# Patient Record
Sex: FEMALE | Race: WHITE | Employment: OTHER | URBAN - METROPOLITAN AREA
[De-identification: names, ages, dates, MRNs, and addresses within clinical notes are randomized per-mention and may not be internally consistent; named-entity substitution may affect disease eponyms.]

---

## 2017-04-08 ENCOUNTER — HOSPITAL ENCOUNTER (EMERGENCY)
Facility: HOSPITAL | Age: 62
Discharge: HOME | End: 2017-04-08
Attending: EMERGENCY MEDICINE
Payer: OTHER MISCELLANEOUS

## 2017-04-08 VITALS
DIASTOLIC BLOOD PRESSURE: 68 MMHG | SYSTOLIC BLOOD PRESSURE: 109 MMHG | BODY MASS INDEX: 37.21 KG/M2 | HEIGHT: 63 IN | HEART RATE: 65 BPM | OXYGEN SATURATION: 97 % | WEIGHT: 210 LBS | RESPIRATION RATE: 18 BRPM | TEMPERATURE: 98 F

## 2017-04-08 DIAGNOSIS — S30.0XXA PELVIC CONTUSION, INITIAL ENCOUNTER: Primary | ICD-10-CM

## 2017-04-08 PROCEDURE — 99282 EMERGENCY DEPT VISIT SF MDM: CPT

## 2017-04-09 NOTE — ED PROVIDER NOTES
Patient Seen in: [facility] Emergency Department    History   Patient presents with:  Lower Extremity Injury (musculoskeletal)    Stated Complaint: workmens comp, hip pain, wheelchair ran into her    HPI    51-year-old female sent in by work after s reviewed and negative except as noted above. PSFH elements reviewed from today and agreed except as otherwise stated in HPI.     Physical Exam       ED Triage Vitals   BP 04/08/17 1803 136/90 mmHg   Pulse 04/08/17 1803 75   Resp 04/08/17 1803 18   Temp 0 soon as possible for a visit        Medications Prescribed:  Current Discharge Medication List

## 2019-01-02 ENCOUNTER — OFFICE VISIT (OUTPATIENT)
Dept: FAMILY MEDICINE CLINIC | Facility: CLINIC | Age: 64
End: 2019-01-02
Payer: COMMERCIAL

## 2019-01-02 VITALS
HEIGHT: 62 IN | RESPIRATION RATE: 20 BRPM | TEMPERATURE: 98 F | WEIGHT: 228.63 LBS | DIASTOLIC BLOOD PRESSURE: 90 MMHG | SYSTOLIC BLOOD PRESSURE: 150 MMHG | HEART RATE: 66 BPM | BODY MASS INDEX: 42.07 KG/M2

## 2019-01-02 DIAGNOSIS — Z76.89 ENCOUNTER TO ESTABLISH CARE: ICD-10-CM

## 2019-01-02 DIAGNOSIS — R09.81 NASAL CONGESTION: Primary | ICD-10-CM

## 2019-01-02 PROCEDURE — 99203 OFFICE O/P NEW LOW 30 MIN: CPT | Performed by: FAMILY MEDICINE

## 2019-01-02 RX ORDER — LEVOTHYROXINE SODIUM 50 UG/1
CAPSULE ORAL
COMMUNITY
End: 2019-06-21

## 2019-01-02 RX ORDER — TRAZODONE HYDROCHLORIDE 50 MG/1
50 TABLET ORAL
COMMUNITY

## 2019-01-02 RX ORDER — MONTELUKAST SODIUM 10 MG/1
10 TABLET ORAL NIGHTLY
Qty: 30 TABLET | Refills: 1 | Status: SHIPPED | OUTPATIENT
Start: 2019-01-02 | End: 2019-03-27

## 2019-01-02 RX ORDER — OMEPRAZOLE 20 MG/1
20 CAPSULE, DELAYED RELEASE ORAL
COMMUNITY

## 2019-01-02 RX ORDER — ERGOCALCIFEROL 1.25 MG/1
CAPSULE ORAL
COMMUNITY

## 2019-01-02 RX ORDER — ALBUTEROL SULFATE 90 UG/1
AEROSOL, METERED RESPIRATORY (INHALATION)
COMMUNITY
End: 2019-05-23

## 2019-01-02 RX ORDER — FLUTICASONE PROPIONATE 50 MCG
2 SPRAY, SUSPENSION (ML) NASAL DAILY
Qty: 1 BOTTLE | Refills: 0 | Status: SHIPPED | OUTPATIENT
Start: 2019-01-02 | End: 2019-01-16

## 2019-01-02 NOTE — PROGRESS NOTES
HPI:    Patient ID: Cristina Hanson is a 66year old female. Patient presents with:  Establish Care  Medication Request: montelukast       HPI  Patient is here to establish care. States she recently moved from [place].  Has a h/o partial Thyroidectomy Rfl: 5   Dorzolamide HCl-Timolol Mal 22.3-6.8 MG/ML Ophthalmic Solution Place 1 drop into both eyes 2 (two) times daily. Disp: 10 mL Rfl: 2   Sertraline HCl 100 MG Oral Tab Take 100 mg by mouth daily.  Disp:  Rfl:    Lithium Carbonate 300 MG Oral Cap Take 3 wheezes. She has no rales. Lymphadenopathy:     She has no cervical adenopathy.               ASSESSMENT/PLAN:     Anjel Mahan was seen today for establish care and medication request.    Diagnoses and all orders for this visit:    Nasal congestion  Most like

## 2019-01-10 ENCOUNTER — OFFICE VISIT (OUTPATIENT)
Dept: FAMILY MEDICINE CLINIC | Facility: CLINIC | Age: 64
End: 2019-01-10
Payer: COMMERCIAL

## 2019-01-10 ENCOUNTER — HOSPITAL ENCOUNTER (OUTPATIENT)
Dept: MAMMOGRAPHY | Age: 64
Discharge: HOME | End: 2019-01-10
Attending: FAMILY MEDICINE
Payer: COMMERCIAL

## 2019-01-10 DIAGNOSIS — R09.81 NASAL CONGESTION: ICD-10-CM

## 2019-01-10 DIAGNOSIS — Z00.00 PHYSICAL EXAM: Primary | ICD-10-CM

## 2019-01-10 DIAGNOSIS — Z12.11 ENCOUNTER FOR SCREENING COLONOSCOPY: ICD-10-CM

## 2019-01-10 DIAGNOSIS — Z11.51 SCREENING FOR HPV (HUMAN PAPILLOMAVIRUS): ICD-10-CM

## 2019-01-10 DIAGNOSIS — Z12.31 SCREENING MAMMOGRAM, ENCOUNTER FOR: ICD-10-CM

## 2019-01-10 DIAGNOSIS — R05.9 COUGH: ICD-10-CM

## 2019-01-10 DIAGNOSIS — Z13.220 LIPID SCREENING: ICD-10-CM

## 2019-01-10 LAB
ANION GAP SERPL CALC-SCNC: 8 MMOL/L (ref 0–18)
BUN BLD-MCNC: 17 MG/DL (ref 8–20)
BUN/CREAT SERPL: 20.7 (ref 10–20)
CALCIUM BLD-MCNC: 9.4 MG/DL (ref 8.3–10.3)
CHLORIDE SERPL-SCNC: 106 MMOL/L (ref 101–111)
CHOLEST SMN-MCNC: 304 MG/DL (ref ?–200)
CO2 SERPL-SCNC: 26 MMOL/L (ref 22–32)
CREAT BLD-MCNC: 0.82 MG/DL (ref 0.55–1.02)
GLUCOSE BLD-MCNC: 98 MG/DL (ref 70–99)
HDLC SERPL-MCNC: 50 MG/DL (ref 40–59)
LDLC SERPL CALC-MCNC: 199 MG/DL (ref ?–100)
NONHDLC SERPL-MCNC: 254 MG/DL (ref ?–130)
OSMOLALITY SERPL CALC.SUM OF ELEC: 292 MOSM/KG (ref 275–295)
POTASSIUM SERPL-SCNC: 4 MMOL/L (ref 3.6–5.1)
SODIUM SERPL-SCNC: 140 MMOL/L (ref 136–144)
TRIGL SERPL-MCNC: 276 MG/DL (ref 30–149)
TSI SER-ACNC: 2.02 MIU/ML (ref 0.35–5.5)
VLDLC SERPL CALC-MCNC: 55 MG/DL (ref 0–30)

## 2019-01-10 PROCEDURE — 80061 LIPID PANEL: CPT | Performed by: FAMILY MEDICINE

## 2019-01-10 PROCEDURE — 99396 PREV VISIT EST AGE 40-64: CPT | Performed by: FAMILY MEDICINE

## 2019-01-10 PROCEDURE — 90732 PPSV23 VACC 2 YRS+ SUBQ/IM: CPT | Performed by: FAMILY MEDICINE

## 2019-01-10 PROCEDURE — 90471 IMMUNIZATION ADMIN: CPT | Performed by: FAMILY MEDICINE

## 2019-01-10 PROCEDURE — 80048 BASIC METABOLIC PNL TOTAL CA: CPT | Performed by: FAMILY MEDICINE

## 2019-01-10 PROCEDURE — 36415 COLL VENOUS BLD VENIPUNCTURE: CPT | Performed by: FAMILY MEDICINE

## 2019-01-10 PROCEDURE — 77067 SCR MAMMO BI INCL CAD: CPT | Performed by: FAMILY MEDICINE

## 2019-01-10 PROCEDURE — 88175 CYTOPATH C/V AUTO FLUID REDO: CPT | Performed by: FAMILY MEDICINE

## 2019-01-10 PROCEDURE — 84443 ASSAY THYROID STIM HORMONE: CPT | Performed by: FAMILY MEDICINE

## 2019-01-10 RX ORDER — AZITHROMYCIN 250 MG/1
TABLET, FILM COATED ORAL
Qty: 6 TABLET | Refills: 0 | Status: SHIPPED | OUTPATIENT
Start: 2019-01-13 | End: 2019-01-14

## 2019-01-10 NOTE — PROGRESS NOTES
HPI:    Patient ID: Elliott Long is a 66year old female.     Patient presents with:  Physical: per pt, physical with pap  Mammogram Screening: reports last mammogram approx 2 years ago  Colon Cancer Screening: reports last colonoscopy approx 5 years ago mg by mouth. Disp:  Rfl:    Montelukast Sodium 10 MG Oral Tab Take 1 tablet (10 mg total) by mouth nightly. Disp: 30 tablet Rfl: 1   Fluticasone Propionate 50 MCG/ACT Nasal Suspension 2 sprays by Each Nare route daily for 14 days.  Disp: 1 Bottle Rfl: 0   l oropharyngeal edema or posterior oropharyngeal erythema. Neck: Normal range of motion. Cardiovascular: Normal heart sounds. No murmur heard. Pulmonary/Chest: Effort normal and breath sounds normal. She has no wheezes. She has no rales.  Right breast

## 2019-01-11 ENCOUNTER — TELEPHONE (OUTPATIENT)
Dept: FAMILY MEDICINE CLINIC | Facility: CLINIC | Age: 64
End: 2019-01-11

## 2019-01-11 VITALS
HEART RATE: 70 BPM | BODY MASS INDEX: 42 KG/M2 | SYSTOLIC BLOOD PRESSURE: 127 MMHG | WEIGHT: 228 LBS | DIASTOLIC BLOOD PRESSURE: 88 MMHG

## 2019-01-11 DIAGNOSIS — E78.00 ELEVATED CHOLESTEROL: Primary | ICD-10-CM

## 2019-01-14 ENCOUNTER — OFFICE VISIT (OUTPATIENT)
Dept: FAMILY MEDICINE CLINIC | Facility: CLINIC | Age: 64
End: 2019-01-14
Payer: COMMERCIAL

## 2019-01-14 VITALS
WEIGHT: 231.38 LBS | HEART RATE: 86 BPM | SYSTOLIC BLOOD PRESSURE: 124 MMHG | BODY MASS INDEX: 41.51 KG/M2 | HEIGHT: 62.5 IN | OXYGEN SATURATION: 95 % | DIASTOLIC BLOOD PRESSURE: 92 MMHG | TEMPERATURE: 98 F

## 2019-01-14 DIAGNOSIS — R10.2 PELVIC PAIN: Primary | ICD-10-CM

## 2019-01-14 PROCEDURE — 99214 OFFICE O/P EST MOD 30 MIN: CPT | Performed by: FAMILY MEDICINE

## 2019-01-14 RX ORDER — TIZANIDINE HYDROCHLORIDE 4 MG/1
4 CAPSULE, GELATIN COATED ORAL 2 TIMES DAILY
Qty: 20 CAPSULE | Refills: 0 | Status: SHIPPED | OUTPATIENT
Start: 2019-01-14 | End: 2019-01-24

## 2019-01-14 RX ORDER — SULFAMETHOXAZOLE AND TRIMETHOPRIM 800; 160 MG/1; MG/1
1 TABLET ORAL 2 TIMES DAILY
Qty: 10 TABLET | Refills: 0 | Status: SHIPPED | OUTPATIENT
Start: 2019-01-14 | End: 2019-07-10

## 2019-01-14 NOTE — PROGRESS NOTES
Ciara Duncan is a 66year old female. Patient presents with:  Vaginal Problem: discuss a few things. .room 5    Subjective   HPI:   Patient here to establish care. Patient states that last week she went to her prior provider and had a Pap smear done. (VENTOLIN HFA) 108 (90 Base) MCG/ACT Inhalation Aero Soln Inhale into the lungs. Disp:  Rfl:    Levothyroxine Sodium (TIROSINT) 50 MCG Oral Cap Take by mouth. Disp:  Rfl:    omeprazole (PRILOSEC) 20 MG Oral Capsule Delayed Release Take 20 mg by mouth.  Disp Gap 8 0 - 18 mmol/L    BUN 17 8 - 20 mg/dL    Creatinine 0.82 0.55 - 1.02 mg/dL    BUN/CREA Ratio 20.7 (H) 10.0 - 20.0    Calcium, Total 9.4 8.3 - 10.3 mg/dL    Calculated Osmolality 292 275 - 295 mOsm/kg    GFR, Non- 77 >=60    GFR, [place] Case: [id number]                                  Authorizing Provider:  Shannen Liriano MD         Collected:           01/10/2019 09:23 AM          Ordering Location:     [place] [address],      Received:            01/10/2019 09:42 PM Prescriptions Disp Refills   • TiZANidine HCl 4 MG Oral Cap 20 capsule 0     Sig: Take 1 capsule (4 mg total) by mouth 2 (two) times daily for 10 days.    • Sulfamethoxazole-TMP -160 MG Oral Tab per tablet 10 tablet 0     Sig: Take 1 tablet by mouth 2

## 2019-01-24 ENCOUNTER — HOSPITAL ENCOUNTER (OUTPATIENT)
Dept: ULTRASOUND IMAGING | Age: 64
Discharge: HOME | End: 2019-01-24
Attending: FAMILY MEDICINE
Payer: COMMERCIAL

## 2019-01-24 ENCOUNTER — HOSPITAL ENCOUNTER (OUTPATIENT)
Dept: MAMMOGRAPHY | Age: 64
Discharge: HOME | End: 2019-01-24
Attending: FAMILY MEDICINE
Payer: COMMERCIAL

## 2019-01-24 DIAGNOSIS — R92.2 INCONCLUSIVE MAMMOGRAM: ICD-10-CM

## 2019-01-24 PROCEDURE — 76642 ULTRASOUND BREAST LIMITED: CPT | Performed by: FAMILY MEDICINE

## 2019-01-24 PROCEDURE — 77065 DX MAMMO INCL CAD UNI: CPT | Performed by: FAMILY MEDICINE

## 2019-01-24 PROCEDURE — 77061 BREAST TOMOSYNTHESIS UNI: CPT | Performed by: FAMILY MEDICINE

## 2019-02-18 ENCOUNTER — TELEPHONE (OUTPATIENT)
Dept: FAMILY MEDICINE CLINIC | Facility: CLINIC | Age: 64
End: 2019-02-18

## 2019-02-18 NOTE — TELEPHONE ENCOUNTER
Last OV: 1/14/20109  Last filled: historical    Left message for pt.  Need to know how many puffs at a time and how many times a day

## 2019-03-27 ENCOUNTER — TELEPHONE (OUTPATIENT)
Dept: FAMILY MEDICINE CLINIC | Facility: CLINIC | Age: 64
End: 2019-03-27

## 2019-03-27 RX ORDER — MONTELUKAST SODIUM 10 MG/1
10 TABLET ORAL NIGHTLY
Qty: 30 TABLET | Refills: 1 | Status: SHIPPED | OUTPATIENT
Start: 2019-03-27 | End: 2019-05-27

## 2019-04-15 ENCOUNTER — OFFICE VISIT (OUTPATIENT)
Dept: FAMILY MEDICINE CLINIC | Facility: CLINIC | Age: 64
End: 2019-04-15
Payer: COMMERCIAL

## 2019-04-15 VITALS
TEMPERATURE: 99 F | DIASTOLIC BLOOD PRESSURE: 86 MMHG | SYSTOLIC BLOOD PRESSURE: 128 MMHG | BODY MASS INDEX: 39.95 KG/M2 | WEIGHT: 234 LBS | HEART RATE: 80 BPM | RESPIRATION RATE: 12 BRPM | HEIGHT: 64 IN

## 2019-04-15 DIAGNOSIS — R10.31 RLQ ABDOMINAL PAIN: Primary | ICD-10-CM

## 2019-04-15 PROBLEM — F43.12 CHRONIC POST-TRAUMATIC STRESS DISORDER (PTSD): Status: ACTIVE | Noted: 2019-03-14

## 2019-04-15 PROBLEM — F31.81 BIPOLAR 2 DISORDER (HCC): Status: ACTIVE | Noted: 2019-03-14

## 2019-04-15 PROCEDURE — 99213 OFFICE O/P EST LOW 20 MIN: CPT | Performed by: FAMILY MEDICINE

## 2019-04-15 NOTE — PROGRESS NOTES
Sergey Reyez is a 66year old female.   Patient presents with:  Abdominal Pain: .inrm 6   Flu    Subjective   HPI:   3  Day history   Abdomen pain  Aches  Headache  Chills  Mild fever    Emesis x 1  Mildly nauseous       Patient believes she has a stomac Oral Cap Take 300 mg by mouth 2 (two) times daily with meals. Disp:  Rfl:    AmLODIPine Besylate 10 MG Oral Tab Take 10 mg by mouth daily. Disp:  Rfl:      No current facility-administered medications on file prior to visit.       Past Medical History:   Isaac Lo covers-called to ER by me          Phone call to [phone] with Dr Andrew Quesada Emergency Room dr David Ellis history  Patient on way in private car        Return if symptoms worsen or fail to improve, for after consultant evaluation.           Meds & Refill

## 2019-04-18 ENCOUNTER — OFFICE VISIT (OUTPATIENT)
Dept: FAMILY MEDICINE CLINIC | Facility: CLINIC | Age: 64
End: 2019-04-18
Payer: COMMERCIAL

## 2019-04-18 VITALS
DIASTOLIC BLOOD PRESSURE: 88 MMHG | RESPIRATION RATE: 16 BRPM | HEART RATE: 67 BPM | TEMPERATURE: 97 F | SYSTOLIC BLOOD PRESSURE: 126 MMHG | WEIGHT: 229.63 LBS | OXYGEN SATURATION: 95 % | BODY MASS INDEX: 39 KG/M2

## 2019-04-18 DIAGNOSIS — J18.9 PNEUMONIA OF RIGHT LOWER LOBE DUE TO INFECTIOUS ORGANISM: Primary | ICD-10-CM

## 2019-04-18 PROCEDURE — 99213 OFFICE O/P EST LOW 20 MIN: CPT | Performed by: FAMILY MEDICINE

## 2019-04-18 RX ORDER — LEVOFLOXACIN 750 MG/1
TABLET ORAL
Refills: 0 | COMMUNITY
Start: 2019-04-16 | End: 2019-05-02 | Stop reason: ALTCHOICE

## 2019-04-19 NOTE — PROGRESS NOTES
Fitz Elliott is a 66year old female. Patient presents with: Follow - Up: per pt    Subjective   HPI:   Patient here for follow-up after having been seen in the emergency room.     We saw her earlier this week and patient had night sweats chills fevers MCG/ACT Inhalation Aero Soln Inhale into the lungs. Disp:  Rfl:    Levothyroxine Sodium (TIROSINT) 50 MCG Oral Cap Take by mouth. Disp:  Rfl:    omeprazole (PRILOSEC) 20 MG Oral Capsule Delayed Release Take 20 mg by mouth.  Disp:  Rfl:    TraZODone HCl 50 M kg/m².      GENERAL: well developed, well nourished,in no apparent distress  SKIN: no rashes,no suspicious lesions  LUNGS: clear to auscultation  CARDIO: RRR without murmur  GI: good BS's,no masses, HSM or tenderness  EXTREMITIES: no cyanosis, clubbing or

## 2019-05-02 ENCOUNTER — OFFICE VISIT (OUTPATIENT)
Dept: FAMILY MEDICINE CLINIC | Facility: CLINIC | Age: 64
End: 2019-05-02
Payer: COMMERCIAL

## 2019-05-02 ENCOUNTER — HOSPITAL ENCOUNTER (OUTPATIENT)
Dept: GENERAL RADIOLOGY | Age: 64
Discharge: HOME | End: 2019-05-02
Attending: FAMILY MEDICINE

## 2019-05-02 VITALS
DIASTOLIC BLOOD PRESSURE: 80 MMHG | TEMPERATURE: 97 F | WEIGHT: 234 LBS | OXYGEN SATURATION: 98 % | RESPIRATION RATE: 12 BRPM | BODY MASS INDEX: 39.95 KG/M2 | SYSTOLIC BLOOD PRESSURE: 110 MMHG | HEIGHT: 64 IN | HEART RATE: 61 BPM

## 2019-05-02 DIAGNOSIS — J18.9 PNEUMONIA OF RIGHT LOWER LOBE DUE TO INFECTIOUS ORGANISM: Primary | ICD-10-CM

## 2019-05-02 DIAGNOSIS — M41.34 THORACOGENIC SCOLIOSIS OF THORACIC REGION: ICD-10-CM

## 2019-05-02 DIAGNOSIS — J18.9 PNEUMONIA OF RIGHT LOWER LOBE DUE TO INFECTIOUS ORGANISM: ICD-10-CM

## 2019-05-02 PROCEDURE — 99214 OFFICE O/P EST MOD 30 MIN: CPT | Performed by: FAMILY MEDICINE

## 2019-05-02 PROCEDURE — 71046 X-RAY EXAM CHEST 2 VIEWS: CPT | Performed by: FAMILY MEDICINE

## 2019-05-02 NOTE — PROGRESS NOTES
Anum Flanagan is a 66year old female. Patient presents with:  Pneumonia: recheck . Jessi Aguila ..inrm6    Chief Complaint Reviewed and Verified  Nursing Notes Reviewed and Verified  Tobacco Reviewed  Allergies Reviewed  Medications Reviewed  Problem List Reviewed Disp:  Rfl:    Levothyroxine Sodium (TIROSINT) 50 MCG Oral Cap Take by mouth. Disp:  Rfl:    omeprazole (PRILOSEC) 20 MG Oral Capsule Delayed Release Take 20 mg by mouth. Disp:  Rfl:    TraZODone HCl 50 MG Oral Tab Take 50 mg by mouth.  Disp:  Rfl:    bruce BMI 40.17 kg/m²  Body mass index is 40.17 kg/m².       GENERAL: well developed, well nourished,in no apparent distress  SKIN: no rashes,no suspicious lesions  No pallor jaundice  No icterus   HEENT: atraumatic, normocephalic,ears and throat are clear  LUNGS

## 2019-05-02 NOTE — ASSESSMENT & PLAN NOTE
Noted today on chest x-ray, patient states she may be needing refill at some point for TENS unit apparatus

## 2019-05-07 ENCOUNTER — TELEPHONE (OUTPATIENT)
Dept: FAMILY MEDICINE CLINIC | Facility: CLINIC | Age: 64
End: 2019-05-07

## 2019-05-07 RX ORDER — AMLODIPINE BESYLATE 10 MG/1
10 TABLET ORAL DAILY
Qty: 90 TABLET | Refills: 0 | Status: SHIPPED | OUTPATIENT
Start: 2019-05-07 | End: 2019-08-06

## 2019-05-07 NOTE — TELEPHONE ENCOUNTER
Amlodipine  10 mg    qvar inhaler  80 mcg      Call to [facility] in [place]

## 2019-05-23 RX ORDER — ALBUTEROL SULFATE 90 UG/1
2 AEROSOL, METERED RESPIRATORY (INHALATION) EVERY 4 HOURS PRN
Qty: 1 INHALER | Refills: 0 | Status: SHIPPED | OUTPATIENT
Start: 2019-05-23 | End: 2019-08-08

## 2019-05-28 RX ORDER — MONTELUKAST SODIUM 10 MG/1
TABLET ORAL
Qty: 30 TABLET | Refills: 1 | Status: SHIPPED | OUTPATIENT
Start: 2019-05-28 | End: 2019-07-23

## 2019-06-21 RX ORDER — LEVOTHYROXINE SODIUM 50 UG/1
1 CAPSULE ORAL DAILY
Qty: 30 CAPSULE | Refills: 6 | Status: SHIPPED | OUTPATIENT
Start: 2019-06-21 | End: 2020-06-22

## 2019-06-24 ENCOUNTER — OFFICE VISIT (OUTPATIENT)
Dept: FAMILY MEDICINE CLINIC | Facility: CLINIC | Age: 64
End: 2019-06-24
Payer: COMMERCIAL

## 2019-06-24 VITALS
TEMPERATURE: 97 F | DIASTOLIC BLOOD PRESSURE: 80 MMHG | RESPIRATION RATE: 12 BRPM | SYSTOLIC BLOOD PRESSURE: 120 MMHG | HEIGHT: 63 IN | WEIGHT: 239.5 LBS | BODY MASS INDEX: 42.44 KG/M2 | HEART RATE: 64 BPM

## 2019-06-24 DIAGNOSIS — M70.51 PES ANSERINUS BURSITIS OF RIGHT KNEE: Primary | ICD-10-CM

## 2019-06-24 PROCEDURE — 99213 OFFICE O/P EST LOW 20 MIN: CPT | Performed by: FAMILY MEDICINE

## 2019-06-24 PROCEDURE — S0020 INJECTION, BUPIVICAINE HYDRO: HCPCS | Performed by: FAMILY MEDICINE

## 2019-06-24 PROCEDURE — 20550 NJX 1 TENDON SHEATH/LIGAMENT: CPT | Performed by: FAMILY MEDICINE

## 2019-06-24 RX ORDER — BUPIVACAINE HYDROCHLORIDE 5 MG/ML
0.5 INJECTION, SOLUTION EPIDURAL; INTRACAUDAL ONCE
Status: COMPLETED | OUTPATIENT
Start: 2019-06-24 | End: 2019-06-24

## 2019-06-24 RX ORDER — TRIAMCINOLONE ACETONIDE 40 MG/ML
20 INJECTION, SUSPENSION INTRA-ARTICULAR; INTRAMUSCULAR ONCE
Status: COMPLETED | OUTPATIENT
Start: 2019-06-24 | End: 2019-06-24

## 2019-06-24 RX ADMIN — TRIAMCINOLONE ACETONIDE 20 MG: 40 INJECTION, SUSPENSION INTRA-ARTICULAR; INTRAMUSCULAR at 10:34:00

## 2019-06-24 RX ADMIN — BUPIVACAINE HYDROCHLORIDE 0.5 ML: 5 INJECTION, SOLUTION EPIDURAL; INTRACAUDAL at 10:33:00

## 2019-06-24 NOTE — PATIENT INSTRUCTIONS
What Is Bursitis? A bursa is a fluid-filled sac. It helps cushion the muscles, tendons, and bones around a joint. When a bursa becomes inflamed, it’s called bursitis. Common symptoms are pain, tenderness, and swelling that limits movement of the joint.

## 2019-06-25 ENCOUNTER — TELEPHONE (OUTPATIENT)
Dept: FAMILY MEDICINE CLINIC | Facility: CLINIC | Age: 64
End: 2019-06-25

## 2019-06-25 DIAGNOSIS — M70.51 PES ANSERINUS BURSITIS OF RIGHT KNEE: Primary | ICD-10-CM

## 2019-06-25 RX ORDER — MELOXICAM 15 MG/1
15 TABLET ORAL DAILY
Qty: 30 TABLET | Refills: 0 | Status: SHIPPED | OUTPATIENT
Start: 2019-06-25 | End: 2020-06-22 | Stop reason: ALTCHOICE

## 2019-06-25 NOTE — TELEPHONE ENCOUNTER
Sent meloxicam    We can fill the thyroid    Does not need brand only generic    Not certain she needs increase in medications for thyroid    But refill ok

## 2019-06-25 NOTE — TELEPHONE ENCOUNTER
Please advise in regards to medication for pain in regards to bursitis? ?        Levothyroxine was sent to [facility] 6/24/2019.  Spoke with Sofia Harden at [facility] who states that script is currently being filled for pt

## 2019-06-25 NOTE — TELEPHONE ENCOUNTER
WAS SEEN YESTERDAY, THOUGHT  WAS CHANGING HER THYROID MEDS & SENDING TO [facility], BUT THEY NEVER GOT ANYTHING, ALSO HE MENTIONED SOMETHING ABOUT A PAIN MEDICATION?  CALL PT

## 2019-07-01 ENCOUNTER — MED REC SCAN ONLY (OUTPATIENT)
Dept: FAMILY MEDICINE CLINIC | Facility: CLINIC | Age: 64
End: 2019-07-01

## 2019-07-10 ENCOUNTER — OFFICE VISIT (OUTPATIENT)
Dept: FAMILY MEDICINE CLINIC | Facility: CLINIC | Age: 64
End: 2019-07-10
Payer: COMMERCIAL

## 2019-07-10 VITALS
TEMPERATURE: 98 F | BODY MASS INDEX: 41.84 KG/M2 | WEIGHT: 236.13 LBS | RESPIRATION RATE: 12 BRPM | HEART RATE: 68 BPM | SYSTOLIC BLOOD PRESSURE: 128 MMHG | DIASTOLIC BLOOD PRESSURE: 80 MMHG | HEIGHT: 63 IN

## 2019-07-10 DIAGNOSIS — J01.90 ACUTE RHINOSINUSITIS: Primary | ICD-10-CM

## 2019-07-10 DIAGNOSIS — R05.9 COUGH: ICD-10-CM

## 2019-07-10 PROCEDURE — 99213 OFFICE O/P EST LOW 20 MIN: CPT | Performed by: FAMILY MEDICINE

## 2019-07-10 RX ORDER — SULFAMETHOXAZOLE AND TRIMETHOPRIM 800; 160 MG/1; MG/1
1 TABLET ORAL 2 TIMES DAILY
Qty: 10 TABLET | Refills: 0 | Status: SHIPPED | OUTPATIENT
Start: 2019-07-10 | End: 2019-07-15

## 2019-07-13 LAB — AMB EXT COLOGUARD RESULT: NEGATIVE

## 2019-07-15 ENCOUNTER — TELEPHONE (OUTPATIENT)
Dept: FAMILY MEDICINE CLINIC | Facility: CLINIC | Age: 64
End: 2019-07-15

## 2019-07-15 NOTE — TELEPHONE ENCOUNTER
Test results for cologuard: Negative repeat in 3 years    Patient advised  Entered in External lab results

## 2019-07-23 ENCOUNTER — TELEPHONE (OUTPATIENT)
Dept: FAMILY MEDICINE CLINIC | Facility: CLINIC | Age: 64
End: 2019-07-23

## 2019-07-23 RX ORDER — MONTELUKAST SODIUM 10 MG/1
TABLET ORAL
Qty: 30 TABLET | Refills: 3 | Status: SHIPPED | OUTPATIENT
Start: 2019-07-23 | End: 2019-11-13

## 2019-08-01 ENCOUNTER — TELEPHONE (OUTPATIENT)
Dept: FAMILY MEDICINE CLINIC | Facility: CLINIC | Age: 64
End: 2019-08-01

## 2019-08-01 NOTE — TELEPHONE ENCOUNTER
RIGHT LEG IS HARD AS A ROCK AND RED, VERY PAINFUL. CALLED BACK TO [address] [place] [address] AND SHE TOLD ME TO TELL OTILIO TO GO TO THE ED.   PT DUDLEY

## 2019-08-01 NOTE — TELEPHONE ENCOUNTER
On exam  2 inches red and induration low leg  No deep venous thrombosis or cellulitis    C/w superficial thrombosis

## 2019-08-06 RX ORDER — AMLODIPINE BESYLATE 10 MG/1
10 TABLET ORAL DAILY
Qty: 90 TABLET | Refills: 0 | Status: SHIPPED | OUTPATIENT
Start: 2019-08-06 | End: 2019-11-13

## 2019-08-08 ENCOUNTER — OFFICE VISIT (OUTPATIENT)
Dept: FAMILY MEDICINE CLINIC | Facility: CLINIC | Age: 64
End: 2019-08-08
Payer: COMMERCIAL

## 2019-08-08 VITALS
SYSTOLIC BLOOD PRESSURE: 126 MMHG | DIASTOLIC BLOOD PRESSURE: 80 MMHG | BODY MASS INDEX: 41.11 KG/M2 | TEMPERATURE: 98 F | HEART RATE: 67 BPM | OXYGEN SATURATION: 97 % | WEIGHT: 232 LBS | RESPIRATION RATE: 20 BRPM | HEIGHT: 63 IN

## 2019-08-08 DIAGNOSIS — I82.811 ACUTE SUPERFICIAL VENOUS THROMBOSIS OF RIGHT LOWER EXTREMITY: Primary | ICD-10-CM

## 2019-08-08 PROCEDURE — 99213 OFFICE O/P EST LOW 20 MIN: CPT | Performed by: FAMILY MEDICINE

## 2019-08-08 PROCEDURE — 1111F DSCHRG MED/CURRENT MED MERGE: CPT | Performed by: FAMILY MEDICINE

## 2019-08-08 RX ORDER — ASPIRIN 325 MG
325 TABLET ORAL DAILY
COMMUNITY
End: 2020-06-22 | Stop reason: ALTCHOICE

## 2019-08-08 RX ORDER — ALBUTEROL SULFATE 90 UG/1
2 AEROSOL, METERED RESPIRATORY (INHALATION) EVERY 4 HOURS PRN
Qty: 1 INHALER | Refills: 0 | Status: SHIPPED | OUTPATIENT
Start: 2019-08-08 | End: 2020-03-05

## 2019-08-08 NOTE — PROGRESS NOTES
Jose Briscoe is a 66year old female. Patient presents with:  Hospital F/U: Thrombophlebitis of superficial veins of right lower extremity . room 5      Chief Complaint Reviewed and Verified  Nursing Notes Reviewed and   Verified  Tobacco Reviewed  Maura Myers by mouth. Disp:  Rfl:    TraZODone HCl 50 MG Oral Tab Take 50 mg by mouth. Disp:  Rfl:    latanoprost 0.005 % Ophthalmic Solution Place 1 drop into both eyes nightly.  Disp: 2.5 mL Rfl: 5   Dorzolamide HCl-Timolol Mal 22.3-6.8 MG/ML Ophthalmic Solution Plac third lower leg consistent with superficial thrombophlebitis. The contralateral common femoral vein demonstrates normal compressibility, augmentation, and color flow without evidence of acute deep venous thrombosis.     IMPRESSION:  Impression: Superfici MCG/ACT Inhalation Aero Soln 1 Inhaler 0     Sig: Inhale 2 puffs into the lungs every 4 (four) hours as needed for Wheezing. Sherrell Rhoades M.D., FAAFP      The patient indicates understanding of these issues and agrees to the plan.

## 2019-09-05 ENCOUNTER — MED REC SCAN ONLY (OUTPATIENT)
Dept: FAMILY MEDICINE CLINIC | Facility: CLINIC | Age: 64
End: 2019-09-05

## 2019-10-02 ENCOUNTER — MED REC SCAN ONLY (OUTPATIENT)
Dept: FAMILY MEDICINE CLINIC | Facility: CLINIC | Age: 64
End: 2019-10-02

## 2019-10-03 ENCOUNTER — IMMUNIZATION (OUTPATIENT)
Dept: FAMILY MEDICINE CLINIC | Facility: CLINIC | Age: 64
End: 2019-10-03
Payer: COMMERCIAL

## 2019-10-03 DIAGNOSIS — Z23 NEED FOR VACCINATION: ICD-10-CM

## 2019-10-03 PROCEDURE — 90686 IIV4 VACC NO PRSV 0.5 ML IM: CPT | Performed by: FAMILY MEDICINE

## 2019-10-03 PROCEDURE — 90471 IMMUNIZATION ADMIN: CPT | Performed by: FAMILY MEDICINE

## 2019-10-05 ENCOUNTER — PATIENT MESSAGE (OUTPATIENT)
Dept: FAMILY MEDICINE CLINIC | Facility: CLINIC | Age: 64
End: 2019-10-05

## 2019-10-07 NOTE — TELEPHONE ENCOUNTER
From: Govind Singletary  To: Brenda Stockton MD  Sent: 10/5/2019 2:08 PM CDT  Subject: Non-Urgent Medical Question    Do I need a pneumonia shot this year? I got a flue shot on Thursday and I'm wondering if I need a second dose.  I know I am not 67 yet, but I

## 2019-10-08 NOTE — TELEPHONE ENCOUNTER
There is a recommendation to get one booster of pneumovax-23 after age 72 if it was 1st received prior to 74

## 2019-11-13 ENCOUNTER — TELEPHONE (OUTPATIENT)
Dept: FAMILY MEDICINE CLINIC | Facility: CLINIC | Age: 64
End: 2019-11-13

## 2019-11-13 RX ORDER — MONTELUKAST SODIUM 10 MG/1
TABLET ORAL
Qty: 30 TABLET | Refills: 3 | Status: SHIPPED | OUTPATIENT
Start: 2019-11-13 | End: 2020-03-12

## 2019-11-13 RX ORDER — AMLODIPINE BESYLATE 10 MG/1
10 TABLET ORAL DAILY
Qty: 90 TABLET | Refills: 0 | Status: SHIPPED | OUTPATIENT
Start: 2019-11-13 | End: 2020-02-10

## 2019-12-09 ENCOUNTER — LABORATORY ENCOUNTER (OUTPATIENT)
Dept: LAB | Age: 64
End: 2019-12-09
Attending: FAMILY MEDICINE
Payer: COMMERCIAL

## 2019-12-09 DIAGNOSIS — E78.00 ELEVATED CHOLESTEROL: Primary | ICD-10-CM

## 2019-12-09 DIAGNOSIS — Z00.00 PHYSICAL EXAM: ICD-10-CM

## 2019-12-09 DIAGNOSIS — Z79.899 ENCOUNTER FOR LONG-TERM (CURRENT) DRUG USE: ICD-10-CM

## 2019-12-09 DIAGNOSIS — F41.1 GAD (GENERALIZED ANXIETY DISORDER): ICD-10-CM

## 2019-12-09 PROCEDURE — 80061 LIPID PANEL: CPT | Performed by: FAMILY MEDICINE

## 2019-12-09 PROCEDURE — 84443 ASSAY THYROID STIM HORMONE: CPT | Performed by: FAMILY MEDICINE

## 2019-12-09 PROCEDURE — 80178 ASSAY OF LITHIUM: CPT | Performed by: FAMILY MEDICINE

## 2019-12-09 PROCEDURE — 80053 COMPREHEN METABOLIC PANEL: CPT | Performed by: FAMILY MEDICINE

## 2019-12-09 PROCEDURE — 36415 COLL VENOUS BLD VENIPUNCTURE: CPT | Performed by: FAMILY MEDICINE

## 2019-12-26 ENCOUNTER — TELEPHONE (OUTPATIENT)
Dept: FAMILY MEDICINE CLINIC | Facility: CLINIC | Age: 64
End: 2019-12-26

## 2019-12-26 DIAGNOSIS — Z12.31 ENCOUNTER FOR SCREENING MAMMOGRAM FOR BREAST CANCER: Primary | ICD-10-CM

## 2019-12-26 NOTE — TELEPHONE ENCOUNTER
ORDER FOR MAMMOGRAM DUE END OF January   - NO [place]   PLEASE PLACE ORDER AND CALL PATIENT SO SHE KNOWS SHE CAN SCHEDULE IN [facility]

## 2020-01-27 ENCOUNTER — HOSPITAL ENCOUNTER (OUTPATIENT)
Dept: MAMMOGRAPHY | Age: 65
Discharge: HOME | End: 2020-01-27
Attending: FAMILY MEDICINE
Payer: COMMERCIAL

## 2020-01-27 DIAGNOSIS — Z12.31 ENCOUNTER FOR SCREENING MAMMOGRAM FOR BREAST CANCER: ICD-10-CM

## 2020-01-27 PROCEDURE — 77067 SCR MAMMO BI INCL CAD: CPT | Performed by: FAMILY MEDICINE

## 2020-02-10 RX ORDER — AMLODIPINE BESYLATE 10 MG/1
TABLET ORAL
Qty: 90 TABLET | Refills: 0 | Status: SHIPPED | OUTPATIENT
Start: 2020-02-10 | End: 2020-05-07

## 2020-02-10 RX ORDER — LEVOTHYROXINE SODIUM 50 UG/1
TABLET ORAL
Qty: 30 TABLET | Refills: 0 | Status: SHIPPED | OUTPATIENT
Start: 2020-02-10 | End: 2020-04-08

## 2020-02-10 NOTE — TELEPHONE ENCOUNTER
Last office visit: 8/8/19  Last refill: Levothyroxine: 6/21/19, Amlodipine: 11/13/19  Last labs: 12/09/19  Future Appointments   Date Time Provider Radha Cook   2/25/2020  1:30 PM [facility] CARD PV ROOM 2 [place] [address]   3/4/2020  3:00 PM [facility] CT MA

## 2020-02-17 ENCOUNTER — OFFICE VISIT (OUTPATIENT)
Dept: FAMILY MEDICINE CLINIC | Facility: CLINIC | Age: 65
End: 2020-02-17
Payer: COMMERCIAL

## 2020-02-17 VITALS
RESPIRATION RATE: 12 BRPM | SYSTOLIC BLOOD PRESSURE: 110 MMHG | BODY MASS INDEX: 40.6 KG/M2 | TEMPERATURE: 97 F | HEIGHT: 63 IN | HEART RATE: 64 BPM | WEIGHT: 229.13 LBS | DIASTOLIC BLOOD PRESSURE: 60 MMHG

## 2020-02-17 DIAGNOSIS — R30.0 DYSURIA: Primary | ICD-10-CM

## 2020-02-17 LAB
APPEARANCE: CLEAR
MULTISTIX LOT#: NORMAL NUMERIC
PH, URINE: 6.5 (ref 4.5–8)
SPECIFIC GRAVITY: 1.02 (ref 1–1.03)
URINE-COLOR: YELLOW
UROBILINOGEN,SEMI-QN: 0.2 MG/DL (ref 0–1.9)

## 2020-02-17 PROCEDURE — 81003 URINALYSIS AUTO W/O SCOPE: CPT | Performed by: FAMILY MEDICINE

## 2020-02-17 PROCEDURE — 99213 OFFICE O/P EST LOW 20 MIN: CPT | Performed by: FAMILY MEDICINE

## 2020-02-17 PROCEDURE — 87086 URINE CULTURE/COLONY COUNT: CPT | Performed by: FAMILY MEDICINE

## 2020-02-17 RX ORDER — SULFAMETHOXAZOLE AND TRIMETHOPRIM 800; 160 MG/1; MG/1
1 TABLET ORAL 2 TIMES DAILY
Qty: 10 TABLET | Refills: 0 | Status: SHIPPED | OUTPATIENT
Start: 2020-02-17 | End: 2020-02-18

## 2020-02-17 NOTE — PROGRESS NOTES
Michel Tamayo is a 60year old female. Patient presents with:  UTI: inrm5.       Chief Complaint Reviewed and Verified  Nursing Notes Reviewed and   Verified  Tobacco Reviewed  Allergies Reviewed  Medications Reviewed    Problem List Reviewed  Medical Hi Oral Tab, Take 50 mg by mouth., Disp: , Rfl:   latanoprost 0.005 % Ophthalmic Solution, Place 1 drop into both eyes nightly., Disp: 2.5 mL, Rfl: 5  Dorzolamide HCl-Timolol Mal 22.3-6.8 MG/ML Ophthalmic Solution, Place 1 drop into both eyes 2 (two) times da tenderness    EXTREMITIES: no cyanosis, clubbing or edema    Results for orders placed or performed in visit on 02/17/20   URINALYSIS, AUTO, W/O SCOPE    Collection Time: 02/17/20 11:42 AM   Result Value Ref Range    Glucose Urine neg mg/dL    Bilirubin ne

## 2020-02-17 NOTE — PROGRESS NOTES
aware in office visit  Ordered culture  Started  Ant-Infective Medications       Sulfamethoxazole-TMP -160 MG Oral Tab per tablet

## 2020-02-18 ENCOUNTER — TELEPHONE (OUTPATIENT)
Dept: FAMILY MEDICINE CLINIC | Facility: CLINIC | Age: 65
End: 2020-02-18

## 2020-02-18 DIAGNOSIS — R30.0 DYSURIA: Primary | ICD-10-CM

## 2020-02-18 RX ORDER — CIPROFLOXACIN 250 MG/1
250 TABLET, FILM COATED ORAL 2 TIMES DAILY
Qty: 10 TABLET | Refills: 0 | Status: SHIPPED | OUTPATIENT
Start: 2020-02-18 | End: 2020-02-23

## 2020-02-18 NOTE — TELEPHONE ENCOUNTER
Pt states that she took the Bactrim yesterday at 3 and developed a itching on her cheeks. Pt states that she also developed a rash later and had to use her inhaler this morning. Pt denies SOB, tongue or lip swelling.  I advised pt that she has taken this me

## 2020-03-04 ENCOUNTER — HOSPITAL ENCOUNTER (OUTPATIENT)
Dept: CT IMAGING | Facility: HOSPITAL | Age: 65
End: 2020-03-04
Attending: FAMILY MEDICINE

## 2020-03-05 ENCOUNTER — OFFICE VISIT (OUTPATIENT)
Dept: FAMILY MEDICINE CLINIC | Facility: CLINIC | Age: 65
End: 2020-03-05
Payer: COMMERCIAL

## 2020-03-05 ENCOUNTER — HOSPITAL ENCOUNTER (OUTPATIENT)
Dept: GENERAL RADIOLOGY | Age: 65
Discharge: HOME | End: 2020-03-05
Attending: NURSE PRACTITIONER
Payer: COMMERCIAL

## 2020-03-05 VITALS
SYSTOLIC BLOOD PRESSURE: 140 MMHG | BODY MASS INDEX: 38.82 KG/M2 | TEMPERATURE: 98 F | RESPIRATION RATE: 12 BRPM | OXYGEN SATURATION: 97 % | HEART RATE: 62 BPM | DIASTOLIC BLOOD PRESSURE: 80 MMHG | HEIGHT: 64 IN | WEIGHT: 227.38 LBS

## 2020-03-05 DIAGNOSIS — I10 ESSENTIAL HYPERTENSION, BENIGN: ICD-10-CM

## 2020-03-05 DIAGNOSIS — J30.89 NON-SEASONAL ALLERGIC RHINITIS DUE TO OTHER ALLERGIC TRIGGER: ICD-10-CM

## 2020-03-05 DIAGNOSIS — J45.41 MODERATE PERSISTENT ASTHMA WITH ACUTE EXACERBATION: Primary | ICD-10-CM

## 2020-03-05 DIAGNOSIS — J45.41 MODERATE PERSISTENT ASTHMA WITH ACUTE EXACERBATION: ICD-10-CM

## 2020-03-05 PROBLEM — J45.909 MODERATE ASTHMA: Status: ACTIVE | Noted: 2020-03-05

## 2020-03-05 PROCEDURE — 71046 X-RAY EXAM CHEST 2 VIEWS: CPT | Performed by: NURSE PRACTITIONER

## 2020-03-05 PROCEDURE — 94640 AIRWAY INHALATION TREATMENT: CPT | Performed by: NURSE PRACTITIONER

## 2020-03-05 PROCEDURE — 99214 OFFICE O/P EST MOD 30 MIN: CPT | Performed by: NURSE PRACTITIONER

## 2020-03-05 RX ORDER — PREDNISONE 20 MG/1
20 TABLET ORAL 2 TIMES DAILY
Qty: 10 TABLET | Refills: 0 | Status: SHIPPED | OUTPATIENT
Start: 2020-03-05 | End: 2020-03-10

## 2020-03-05 RX ORDER — IPRATROPIUM BROMIDE AND ALBUTEROL SULFATE 2.5; .5 MG/3ML; MG/3ML
3 SOLUTION RESPIRATORY (INHALATION) ONCE
Status: COMPLETED | OUTPATIENT
Start: 2020-03-05 | End: 2020-03-05

## 2020-03-05 RX ORDER — AZITHROMYCIN 250 MG/1
TABLET, FILM COATED ORAL
Qty: 6 TABLET | Refills: 0 | Status: SHIPPED | OUTPATIENT
Start: 2020-03-05 | End: 2020-03-10

## 2020-03-05 RX ORDER — ALBUTEROL SULFATE 90 UG/1
2 AEROSOL, METERED RESPIRATORY (INHALATION) EVERY 4 HOURS PRN
Qty: 1 INHALER | Refills: 2 | Status: SHIPPED | OUTPATIENT
Start: 2020-03-05

## 2020-03-05 RX ADMIN — IPRATROPIUM BROMIDE AND ALBUTEROL SULFATE 3 ML: 2.5; .5 SOLUTION RESPIRATORY (INHALATION) at 10:06:00

## 2020-03-05 NOTE — PROGRESS NOTES
HPI:   Cough   This is a chronic problem. Episode onset: worse last couple days ago, asthma with spasms for 2 weeks. The problem occurs every few hours. The cough is non-productive.  Associated symptoms include postnasal drip, rhinorrhea, shortness of dada eyes nightly. 2.5 mL 5   • Dorzolamide HCl-Timolol Mal 22.3-6.8 MG/ML Ophthalmic Solution Place 1 drop into both eyes 2 (two) times daily. 10 mL 2   • Sertraline HCl 100 MG Oral Tab Take 100 mg by mouth daily.      • Lithium Carbonate 300 MG Oral Cap Take 3 frontal sinus tenderness. Left sinus exhibits no maxillary sinus tenderness and no frontal sinus tenderness.    Mouth/Throat: Uvula is midline, oropharynx is clear and moist and mucous membranes are normal.   Cardiovascular: Normal rate, regular rhythm and

## 2020-03-10 ENCOUNTER — TELEPHONE (OUTPATIENT)
Dept: FAMILY MEDICINE CLINIC | Facility: CLINIC | Age: 65
End: 2020-03-10

## 2020-03-10 ENCOUNTER — PATIENT MESSAGE (OUTPATIENT)
Dept: FAMILY MEDICINE CLINIC | Facility: CLINIC | Age: 65
End: 2020-03-10

## 2020-03-10 DIAGNOSIS — J45.41 MODERATE PERSISTENT ASTHMA WITH ACUTE EXACERBATION: ICD-10-CM

## 2020-03-10 RX ORDER — LEVOFLOXACIN 750 MG/1
750 TABLET ORAL DAILY
Qty: 10 TABLET | Refills: 0 | Status: SHIPPED | OUTPATIENT
Start: 2020-03-10 | End: 2020-03-20

## 2020-03-10 RX ORDER — PREDNISONE 20 MG/1
TABLET ORAL
Qty: 9 TABLET | Refills: 0 | Status: SHIPPED | OUTPATIENT
Start: 2020-03-10 | End: 2020-06-22 | Stop reason: ALTCHOICE

## 2020-03-10 NOTE — TELEPHONE ENCOUNTER
From: Andres Lennon  To: CARLOS Pathak  Sent: 3/10/2020 7:18 AM CDT  Subject: Non-Urgent Medical Question    I still have a tight chest and cough and a bit of weasing. Should I do something else. No fever taking meds as prescribed.

## 2020-03-10 NOTE — TELEPHONE ENCOUNTER
levaquin 750 mg a day 10 d    And also extend prednisone  To 20 mg 2 a day for 3 plus 1 a day for 3 d    Confirm she has the ventolin still

## 2020-03-10 NOTE — TELEPHONE ENCOUNTER
Spoke with patient who was recently seen last Thursday by Wilber Smith. She had a chest xray done and was prescribed Prednisone, Azithromycin, and QVAR inhaler. Patient states she is still not feeling better.  She is still complaining of chest tightness

## 2020-03-10 NOTE — TELEPHONE ENCOUNTER
Spoke with patient and she verbalized understanding. Scripts sent. She is taking the Ventolin inhaler as needed and the Qvar inhaler as well. Patient instructed to call back if she has any questions or concerns.

## 2020-03-12 RX ORDER — MONTELUKAST SODIUM 10 MG/1
TABLET ORAL
Qty: 30 TABLET | Refills: 0 | Status: SHIPPED | OUTPATIENT
Start: 2020-03-12 | End: 2020-04-13

## 2020-03-12 NOTE — TELEPHONE ENCOUNTER
LOV  3/5/2020    LAST LAB  12/9/2019    LAST RX  11/13/2019  (30 tabs, 3 refill)    Next OV  No future appointments.     PROTOCOL    Montelukast Sodium 10 MG Oral Tablet          Will file in chart as: MONTELUKAST SODIUM 10 MG Oral Tab         Sig: TAKE 1 T

## 2020-04-08 RX ORDER — LEVOTHYROXINE SODIUM 50 UG/1
TABLET ORAL
Qty: 30 TABLET | Refills: 0 | Status: SHIPPED | OUTPATIENT
Start: 2020-04-08 | End: 2020-05-07

## 2020-04-13 RX ORDER — MONTELUKAST SODIUM 10 MG/1
TABLET ORAL
Qty: 30 TABLET | Refills: 0 | Status: SHIPPED | OUTPATIENT
Start: 2020-04-13 | End: 2020-05-27

## 2020-05-07 RX ORDER — LEVOTHYROXINE SODIUM 50 UG/1
TABLET ORAL
Qty: 30 TABLET | Refills: 0 | Status: SHIPPED | OUTPATIENT
Start: 2020-05-07 | End: 2020-06-08

## 2020-05-07 RX ORDER — AMLODIPINE BESYLATE 10 MG/1
TABLET ORAL
Qty: 90 TABLET | Refills: 0 | Status: SHIPPED | OUTPATIENT
Start: 2020-05-07 | End: 2020-08-16

## 2020-05-07 NOTE — TELEPHONE ENCOUNTER
LOV: 3-5-2020    LAST LAB: 12-9-2019    LAST RX:  EUTHYROX 50 MCG Oral Tab 30 tablet 0 refills  4/8/2020     Sig: Take 1 tablet by mouth once daily    Sent to pharmacy as: Euthyrox 50 MCG Oral Tablet (Levothyroxine Sodium)      AMLODIPINE BESYLATE 10 MG Or

## 2020-05-27 ENCOUNTER — TELEPHONE (OUTPATIENT)
Dept: FAMILY MEDICINE CLINIC | Facility: CLINIC | Age: 65
End: 2020-05-27

## 2020-05-27 RX ORDER — MONTELUKAST SODIUM 10 MG/1
TABLET ORAL
Qty: 30 TABLET | Refills: 0 | Status: SHIPPED | OUTPATIENT
Start: 2020-05-27 | End: 2020-06-22

## 2020-05-27 NOTE — TELEPHONE ENCOUNTER
Last refill: 4/13/20 #30 w/ 0 refills  Last OV: 2/17/20    Future Appointments   Date Time Provider Radha Cook   6/22/2020  9:30 AM CARLOS Simmons [facility]

## 2020-05-27 NOTE — TELEPHONE ENCOUNTER
MONTELUKAST SODIUM 10 MG Oral Tab    Patient needs 18 pills to get her through until the next refill. Patient states that she has misplaced her medication and can't find it.      LOIS IN [facility]    LAST SEEN 03/05/2020

## 2020-06-08 RX ORDER — LEVOTHYROXINE SODIUM 50 UG/1
TABLET ORAL
Qty: 30 TABLET | Refills: 0 | Status: SHIPPED | OUTPATIENT
Start: 2020-06-08 | End: 2020-07-06

## 2020-06-08 NOTE — TELEPHONE ENCOUNTER
Thyroid Supplements Protocol Passed6/8 8:10 AM   TSH test in past 12 months    TSH value between 0.350 and 5.500 IU/ml    Appointment in past 12 or next 3 months     Last Lab 12/19/19 Thyroid test normal.  Continue current dose and recheck 6 mos. --tanna

## 2020-06-22 ENCOUNTER — OFFICE VISIT (OUTPATIENT)
Dept: FAMILY MEDICINE CLINIC | Facility: CLINIC | Age: 65
End: 2020-06-22
Payer: COMMERCIAL

## 2020-06-22 ENCOUNTER — LAB ENCOUNTER (OUTPATIENT)
Dept: LAB | Age: 65
End: 2020-06-22
Attending: NURSE PRACTITIONER
Payer: COMMERCIAL

## 2020-06-22 VITALS
BODY MASS INDEX: 40.37 KG/M2 | DIASTOLIC BLOOD PRESSURE: 80 MMHG | WEIGHT: 219.38 LBS | HEIGHT: 62 IN | TEMPERATURE: 97 F | SYSTOLIC BLOOD PRESSURE: 136 MMHG | OXYGEN SATURATION: 96 % | HEART RATE: 62 BPM

## 2020-06-22 DIAGNOSIS — F31.81 BIPOLAR 2 DISORDER (HCC): ICD-10-CM

## 2020-06-22 DIAGNOSIS — Z13.820 SCREENING FOR OSTEOPOROSIS: ICD-10-CM

## 2020-06-22 DIAGNOSIS — M41.34 THORACOGENIC SCOLIOSIS OF THORACIC REGION: ICD-10-CM

## 2020-06-22 DIAGNOSIS — E55.9 VITAMIN D DEFICIENCY: ICD-10-CM

## 2020-06-22 DIAGNOSIS — M15.9 OSTEOARTHRITIS OF MULTIPLE JOINTS, UNSPECIFIED OSTEOARTHRITIS TYPE: ICD-10-CM

## 2020-06-22 DIAGNOSIS — J45.40 MODERATE PERSISTENT ASTHMA WITHOUT COMPLICATION: ICD-10-CM

## 2020-06-22 DIAGNOSIS — J30.89 NON-SEASONAL ALLERGIC RHINITIS DUE TO OTHER ALLERGIC TRIGGER: ICD-10-CM

## 2020-06-22 DIAGNOSIS — E03.9 HYPOTHYROIDISM, UNSPECIFIED TYPE: ICD-10-CM

## 2020-06-22 DIAGNOSIS — Z00.00 GENERAL MEDICAL EXAM: Primary | ICD-10-CM

## 2020-06-22 DIAGNOSIS — Z00.00 GENERAL MEDICAL EXAM: ICD-10-CM

## 2020-06-22 DIAGNOSIS — I10 ESSENTIAL HYPERTENSION, BENIGN: ICD-10-CM

## 2020-06-22 PROCEDURE — 36415 COLL VENOUS BLD VENIPUNCTURE: CPT | Performed by: NURSE PRACTITIONER

## 2020-06-22 PROCEDURE — 82306 VITAMIN D 25 HYDROXY: CPT | Performed by: NURSE PRACTITIONER

## 2020-06-22 PROCEDURE — 80061 LIPID PANEL: CPT | Performed by: NURSE PRACTITIONER

## 2020-06-22 PROCEDURE — 84439 ASSAY OF FREE THYROXINE: CPT | Performed by: NURSE PRACTITIONER

## 2020-06-22 PROCEDURE — 99396 PREV VISIT EST AGE 40-64: CPT | Performed by: NURSE PRACTITIONER

## 2020-06-22 PROCEDURE — 80050 GENERAL HEALTH PANEL: CPT | Performed by: NURSE PRACTITIONER

## 2020-06-22 PROCEDURE — 96127 BRIEF EMOTIONAL/BEHAV ASSMT: CPT | Performed by: NURSE PRACTITIONER

## 2020-06-22 PROCEDURE — 80178 ASSAY OF LITHIUM: CPT | Performed by: NURSE PRACTITIONER

## 2020-06-22 RX ORDER — LORATADINE 10 MG/1
10 TABLET ORAL DAILY
COMMUNITY

## 2020-06-22 RX ORDER — MONTELUKAST SODIUM 10 MG/1
10 TABLET ORAL NIGHTLY
Qty: 90 TABLET | Refills: 0 | Status: SHIPPED | OUTPATIENT
Start: 2020-06-22 | End: 2020-09-22

## 2020-06-22 NOTE — PROGRESS NOTES
HPI:   Patient is here for regular check up. Asthma currently well-controlled with current regimen. She did have an asthma attack about a week ago. She had driven to [place] and home with the windows down.  Heat, grass, and poor air quality trigger her as TraZODone HCl 50 MG Oral Tab Take 50 mg by mouth. • latanoprost 0.005 % Ophthalmic Solution Place 1 drop into both eyes nightly.  2.5 mL 5   • Dorzolamide HCl-Timolol Mal 22.3-6.8 MG/ML Ophthalmic Solution Place 1 drop into both eyes 2 (two) times daily frequency, hematuria and difficulty urinating. Musculoskeletal: Positive for back pain and joint pain. Negative for joint swelling. Has arthritis   Skin: Negative for rash. Neurological: Negative for dizziness, weakness, numbness and headaches. dry.   Psychiatric: She has a normal mood and affect. ASSESSMENT AND PLAN:   Diagnoses and all orders for this visit:    General medical exam  -     CBC WITH DIFFERENTIAL WITH PLATELET; Future  -     COMP METABOLIC PANEL (14);  Future  -     TSH+FREE

## 2020-06-25 ENCOUNTER — OFFICE VISIT (OUTPATIENT)
Dept: FAMILY MEDICINE CLINIC | Facility: CLINIC | Age: 65
End: 2020-06-25
Payer: COMMERCIAL

## 2020-06-25 VITALS
WEIGHT: 219 LBS | DIASTOLIC BLOOD PRESSURE: 80 MMHG | HEART RATE: 88 BPM | BODY MASS INDEX: 40.3 KG/M2 | RESPIRATION RATE: 12 BRPM | SYSTOLIC BLOOD PRESSURE: 128 MMHG | TEMPERATURE: 98 F | HEIGHT: 62 IN

## 2020-06-25 DIAGNOSIS — G47.33 OSA ON CPAP: Primary | ICD-10-CM

## 2020-06-25 DIAGNOSIS — Z99.89 OSA ON CPAP: Primary | ICD-10-CM

## 2020-06-25 PROCEDURE — 99213 OFFICE O/P EST LOW 20 MIN: CPT | Performed by: FAMILY MEDICINE

## 2020-06-25 NOTE — PROGRESS NOTES
Nina Morales is a 60year old female.   Patient presents with:  Sleep Problem      Chief Complaint Reviewed and Verified  No Further Nursing Notes to Review    Tobacco Reviewed  Allergies Reviewed  Medications Reviewed  Problem   List Reviewed  Medical H mouth., Disp: , Rfl:   latanoprost 0.005 % Ophthalmic Solution, Place 1 drop into both eyes nightly., Disp: 2.5 mL, Rfl: 5  Dorzolamide HCl-Timolol Mal 22.3-6.8 MG/ML Ophthalmic Solution, Place 1 drop into both eyes 2 (two) times daily. , Disp: 10 mL, Rfl: Overview     Has had machine since 2013         Current Assessment & Plan     Has not been using recently  Needs new supplier to get new mask for equipment           Relevant Orders    SLEEP MEDICINE - INTERNAL          Return if symptoms worsen or fail to

## 2020-07-06 RX ORDER — LEVOTHYROXINE SODIUM 50 UG/1
TABLET ORAL
Qty: 30 TABLET | Refills: 0 | Status: SHIPPED | OUTPATIENT
Start: 2020-07-06 | End: 2020-08-03

## 2020-07-06 NOTE — TELEPHONE ENCOUNTER
LOV : 6/25/20    LAST LAB:  6/22/2020    LAST RX  EUTHYROX 50 MCG Oral Tab 30 tablet 0refills 6/8/2020    Sig:   Take 1 tablet by mouth once daily           Next OV:   Future Appointments   Date Time Provider Radha Cook   7/13/2020  8:40 AM Radu Fierro

## 2020-07-23 ENCOUNTER — HOSPITAL ENCOUNTER (OUTPATIENT)
Dept: BONE DENSITY | Age: 65
Discharge: HOME | End: 2020-07-23
Attending: NURSE PRACTITIONER
Payer: COMMERCIAL

## 2020-07-23 DIAGNOSIS — Z13.820 SCREENING FOR OSTEOPOROSIS: ICD-10-CM

## 2020-07-23 PROCEDURE — 77080 DXA BONE DENSITY AXIAL: CPT | Performed by: NURSE PRACTITIONER

## 2020-07-31 ENCOUNTER — TELEPHONE (OUTPATIENT)
Dept: FAMILY MEDICINE CLINIC | Facility: CLINIC | Age: 65
End: 2020-07-31

## 2020-07-31 RX ORDER — SWAB
1 SWAB, NON-MEDICATED MISCELLANEOUS DAILY
Qty: 100 CAPSULE | Refills: 3 | COMMUNITY
Start: 2020-07-31 | End: 2020-08-30

## 2020-07-31 RX ORDER — CALCIUM CARBONATE 500(1250)
600 TABLET ORAL DAILY
Qty: 90 TABLET | Refills: 0 | COMMUNITY
Start: 2020-07-31 | End: 2020-08-30

## 2020-07-31 NOTE — TELEPHONE ENCOUNTER
CONCLUSION:    1. Bone mineral density values are compatible with the diagnosis of osteopenia by WHO definition (T score between -1.0 and -2.5).   If therapy is initiated, follow-up study is recommended in 2 years for further evaluation of therapeutic effic

## 2020-07-31 NOTE — TELEPHONE ENCOUNTER
Gene Blanc is calling to let us know that she had a DEXA scan done 7/23/2020, Mills Guanaco received a letter in the mail saying that she needed to have one done.

## 2020-08-03 RX ORDER — LEVOTHYROXINE SODIUM 50 UG/1
TABLET ORAL
Qty: 30 TABLET | Refills: 0 | Status: SHIPPED | OUTPATIENT
Start: 2020-08-03 | End: 2020-09-01

## 2020-08-17 ENCOUNTER — TELEPHONE (OUTPATIENT)
Dept: FAMILY MEDICINE CLINIC | Facility: CLINIC | Age: 65
End: 2020-08-17

## 2020-08-17 RX ORDER — AMLODIPINE BESYLATE 10 MG/1
10 TABLET ORAL DAILY
Qty: 90 TABLET | Refills: 0 | Status: SHIPPED | OUTPATIENT
Start: 2020-08-17

## 2020-08-17 NOTE — TELEPHONE ENCOUNTER
Patient had an HST completed through [facility]. The HST has not been read yet and she has a sleep appt with Dr Stew Cohen on 8/25/20. Advised patient to continue to check with [facility] for results. They should faxed to Dr. Stew Cohen prior to patient's appointment.      If

## 2020-08-17 NOTE — TELEPHONE ENCOUNTER
Patient states that a different provider suggest pt to have a sleep study test, pt has an ppt with Dr Radha Norris on 8/25/20. Wants to know if she should keep that appt or wait until she hears when she would be taking that sleep study. Would like a call back.

## 2020-08-17 NOTE — TELEPHONE ENCOUNTER
LOV 6/25/20 b/p 116/78    LAST LAB 6/22/20    LAST RX 5/7/20 #90/0rf    Next OV None scheduled here    PROTOCOL

## 2020-08-19 ENCOUNTER — PATIENT MESSAGE (OUTPATIENT)
Dept: FAMILY MEDICINE CLINIC | Facility: CLINIC | Age: 65
End: 2020-08-19

## 2020-08-19 NOTE — TELEPHONE ENCOUNTER
From: Anum Flanagan  To: Corine Ruano MD  Sent: 8/19/2020 10:43 AM CDT  Subject: Non-Urgent Medical Question    Can you have your nurse make a note that I got my flu shot on 8/17/2020.  It is Flulaval PFS 2020 -21 in 0.5ml

## 2020-08-21 ENCOUNTER — OFFICE VISIT (OUTPATIENT)
Dept: FAMILY MEDICINE CLINIC | Facility: CLINIC | Age: 65
End: 2020-08-21
Payer: COMMERCIAL

## 2020-08-21 VITALS
TEMPERATURE: 97 F | HEART RATE: 55 BPM | SYSTOLIC BLOOD PRESSURE: 140 MMHG | OXYGEN SATURATION: 98 % | DIASTOLIC BLOOD PRESSURE: 90 MMHG | BODY MASS INDEX: 39 KG/M2 | WEIGHT: 212 LBS

## 2020-08-21 DIAGNOSIS — S33.9XXA SPRAIN OF LIGAMENT OF LUMBOSACRAL JOINT, INITIAL ENCOUNTER: Primary | ICD-10-CM

## 2020-08-21 PROCEDURE — 99213 OFFICE O/P EST LOW 20 MIN: CPT | Performed by: FAMILY MEDICINE

## 2020-08-21 PROCEDURE — 3080F DIAST BP >= 90 MM HG: CPT | Performed by: FAMILY MEDICINE

## 2020-08-21 PROCEDURE — 3077F SYST BP >= 140 MM HG: CPT | Performed by: FAMILY MEDICINE

## 2020-08-21 RX ORDER — PREDNISONE 20 MG/1
TABLET ORAL
Qty: 30 TABLET | Refills: 0 | Status: SHIPPED | OUTPATIENT
Start: 2020-08-21

## 2020-08-21 RX ORDER — MELOXICAM 15 MG/1
15 TABLET ORAL DAILY
Qty: 30 TABLET | Refills: 0 | Status: SHIPPED | OUTPATIENT
Start: 2020-08-21 | End: 2020-09-20

## 2020-08-24 NOTE — PROGRESS NOTES
Clemencia Harkins is a 60year old female. Patient presents with:  Back Pain: x 6 days--- right lower back.  spasms      Chief Complaint Reviewed and Verified  Nursing Notes Reviewed and   Verified  Tobacco Reviewed  Allergies Reviewed  Medications Reviewed (VENTOLIN HFA) 108 (90 Base) MCG/ACT Inhalation Aero Soln, Inhale 2 puffs into the lungs every 4 (four) hours as needed for Wheezing or Shortness of Breath., Disp: 1 Inhaler, Rfl: 2  Multiple Vitamin (MULTI-DAY VITAMINS OR), Take by mouth., Disp: , Rfl: apparent distress  SKIN: no rashes,no suspicious lesions  No rash  No vesicular or herpetic rash noted  EXTREMITIES: no cyanosis, clubbing or edema  Patient has severe spasm in the lower lumbar area, there is distinct pain and right sciatic notch.     Dolly

## 2020-09-01 RX ORDER — LEVOTHYROXINE SODIUM 50 UG/1
TABLET ORAL
Qty: 90 TABLET | Refills: 0 | Status: SHIPPED | OUTPATIENT
Start: 2020-09-01

## 2020-09-01 NOTE — TELEPHONE ENCOUNTER
LOV 8/21/20 ACUTE    LAST LAB 6/22/20    LAST RX 8/3/20 X 1 MONTH    Next OV No future appointments.       PROTOCOL  Thyroid Supplements Protocol Passed9/1 10:04 AM   TSH test in past 12 months    TSH value between 0.350 and 5.500 IU/ml    Appointment in pa

## 2020-09-21 DIAGNOSIS — Z79.899 ENCOUNTER FOR LONG-TERM CURRENT USE OF MEDICATION: ICD-10-CM

## 2020-09-21 DIAGNOSIS — E78.00 HYPERCHOLESTEROLEMIA: ICD-10-CM

## 2020-09-21 DIAGNOSIS — I10 ESSENTIAL HYPERTENSION, BENIGN: Primary | ICD-10-CM

## 2020-09-21 DIAGNOSIS — J30.89 NON-SEASONAL ALLERGIC RHINITIS DUE TO OTHER ALLERGIC TRIGGER: ICD-10-CM

## 2020-09-21 DIAGNOSIS — E03.9 HYPOTHYROIDISM, UNSPECIFIED TYPE: ICD-10-CM

## 2020-09-21 DIAGNOSIS — F31.81 BIPOLAR 2 DISORDER (HCC): ICD-10-CM

## 2020-09-22 RX ORDER — MONTELUKAST SODIUM 10 MG/1
TABLET ORAL
Qty: 90 TABLET | Refills: 0 | Status: SHIPPED | OUTPATIENT
Start: 2020-09-22

## 2022-06-14 ENCOUNTER — TELEPHONE (OUTPATIENT)
Dept: FAMILY MEDICINE CLINIC | Facility: CLINIC | Age: 67
End: 2022-06-14

## 2022-06-14 NOTE — TELEPHONE ENCOUNTER
Contacted patient to see if Dr. Kailyn Cleveland is still her PCP. Patient states she has a new PCP.

## (undated) NOTE — LETTER
01/21/21        [place] [id number]      Dear Rosaline Viera,    [facility] records indicate that you have outstanding lab work and or testing that was ordered for you and has not yet been completed:  Orders Placed This Encounter

## (undated) NOTE — ED AVS SNAPSHOT
[facility] Emergency Department    [place]  [address]    [place] [address]    Phone:  [phone]    Fax:  Edwin Carvajal   MRN: [id number]    Department:  [facility] Emergency Department   Date of Visit:  4/8/ IF THERE IS ANY CHANGE OR WORSENING OF YOUR CONDITION, CALL YOUR PRIMARY CARE PHYSICIAN AT ONCE OR RETURN IMMEDIATELY TO THE EMERGENCY DEPARTMENT.     If you have been prescribed any medication(s), please fill your prescription right away and begin taking t

## (undated) NOTE — ED AVS SNAPSHOT
[facility] Emergency Department    [place]  [address]    [place] [address]    Phone:  [phone]    Fax:  Edwin Carvajal   MRN: [id number]    Department:  [facility] Emergency Department   Date of Visit:  4/8/ from our patient liason soon after your visit. Also, some patients receive a detailed feedback survey mailed to them a week after the visit. If you receive this, we would really appreciate it if you could take the time to complete it. Thank you!       You [facility] [address] [facility] [id number] ([address]) [facility] [address] [facility]. ([address]) [facility] Support Staff. Remember, MyChart is NOT to be used for urgent needs. For medical emergencies, dial 911.

## (undated) NOTE — LETTER
ASTHMA ACTION PLAN for Deni Chen     : 1955     Date: 2020  Provider:  CARLOS Chapin  Phone for doctor or clinic:  [facility], [address] [place] [address] Drive  [address] [place] [address]  [phone]

## (undated) NOTE — LETTER
07/22/20      [address]  [address] [address]        Dear Rashi Adler,    [facility] records indicate that you are due for a Dexa Scan. Your order is active and ready to be fulfilled.  Please call our central scheduling number [phone] If you